# Patient Record
Sex: MALE | Race: OTHER | Employment: OTHER | URBAN - METROPOLITAN AREA
[De-identification: names, ages, dates, MRNs, and addresses within clinical notes are randomized per-mention and may not be internally consistent; named-entity substitution may affect disease eponyms.]

---

## 2020-11-11 ENCOUNTER — APPOINTMENT (OUTPATIENT)
Dept: GENERAL RADIOLOGY | Facility: HOSPITAL | Age: 45
End: 2020-11-11
Attending: EMERGENCY MEDICINE
Payer: COMMERCIAL

## 2020-11-11 ENCOUNTER — HOSPITAL ENCOUNTER (OUTPATIENT)
Facility: HOSPITAL | Age: 45
Setting detail: OBSERVATION
Discharge: HOME | End: 2020-11-13
Attending: EMERGENCY MEDICINE | Admitting: HOSPITALIST
Payer: COMMERCIAL

## 2020-11-11 ENCOUNTER — HOSPITAL ENCOUNTER (OUTPATIENT)
Age: 45
Discharge: ANOTHER HOSPITAL | End: 2020-11-11
Payer: COMMERCIAL

## 2020-11-11 VITALS
HEART RATE: 97 BPM | OXYGEN SATURATION: 93 % | SYSTOLIC BLOOD PRESSURE: 129 MMHG | RESPIRATION RATE: 26 BRPM | DIASTOLIC BLOOD PRESSURE: 81 MMHG

## 2020-11-11 DIAGNOSIS — U07.1 PNEUMONIA DUE TO COVID-19 VIRUS: Primary | ICD-10-CM

## 2020-11-11 DIAGNOSIS — U07.1 COVID-19: Primary | ICD-10-CM

## 2020-11-11 DIAGNOSIS — J12.82 PNEUMONIA DUE TO COVID-19 VIRUS: Primary | ICD-10-CM

## 2020-11-11 PROCEDURE — 71045 X-RAY EXAM CHEST 1 VIEW: CPT | Performed by: EMERGENCY MEDICINE

## 2020-11-11 PROCEDURE — 99220 INITIAL OBSERVATION CARE,LEVL III: CPT | Performed by: HOSPITALIST

## 2020-11-11 PROCEDURE — 99203 OFFICE O/P NEW LOW 30 MIN: CPT

## 2020-11-11 RX ORDER — HYDROCHLOROTHIAZIDE 12.5 MG/1
12.5 TABLET ORAL DAILY
COMMUNITY
Start: 2020-10-10

## 2020-11-11 RX ORDER — ENOXAPARIN SODIUM 100 MG/ML
40 INJECTION SUBCUTANEOUS NIGHTLY
Status: DISCONTINUED | OUTPATIENT
Start: 2020-11-11 | End: 2020-11-13

## 2020-11-11 RX ORDER — ESCITALOPRAM OXALATE 10 MG/1
10 TABLET ORAL DAILY
Status: DISCONTINUED | OUTPATIENT
Start: 2020-11-12 | End: 2020-11-13

## 2020-11-11 RX ORDER — DILTIAZEM HYDROCHLORIDE 240 MG/1
240 CAPSULE, EXTENDED RELEASE ORAL DAILY
COMMUNITY

## 2020-11-11 RX ORDER — ONDANSETRON 2 MG/ML
4 INJECTION INTRAMUSCULAR; INTRAVENOUS EVERY 6 HOURS PRN
Status: DISCONTINUED | OUTPATIENT
Start: 2020-11-11 | End: 2020-11-13

## 2020-11-11 RX ORDER — PIOGLITAZONEHYDROCHLORIDE 30 MG/1
30 TABLET ORAL DAILY
COMMUNITY
Start: 2020-05-25

## 2020-11-11 RX ORDER — DILTIAZEM HYDROCHLORIDE 120 MG/1
240 CAPSULE, EXTENDED RELEASE ORAL DAILY
Status: DISCONTINUED | OUTPATIENT
Start: 2020-11-12 | End: 2020-11-13

## 2020-11-11 RX ORDER — ATORVASTATIN CALCIUM 40 MG/1
TABLET, FILM COATED ORAL
COMMUNITY
End: 2020-11-11 | Stop reason: CLARIF

## 2020-11-11 RX ORDER — HYDROCHLOROTHIAZIDE 12.5 MG/1
12.5 CAPSULE, GELATIN COATED ORAL DAILY
Status: DISCONTINUED | OUTPATIENT
Start: 2020-11-12 | End: 2020-11-13

## 2020-11-11 RX ORDER — ESCITALOPRAM OXALATE 10 MG/1
10 TABLET ORAL DAILY
COMMUNITY
Start: 2020-07-10

## 2020-11-11 RX ORDER — ACETAMINOPHEN 500 MG
1000 TABLET ORAL EVERY 6 HOURS PRN
Status: DISCONTINUED | OUTPATIENT
Start: 2020-11-11 | End: 2020-11-13

## 2020-11-11 RX ORDER — PIOGLITAZONEHYDROCHLORIDE 15 MG/1
15 TABLET ORAL DAILY
COMMUNITY
Start: 2020-08-29 | End: 2020-11-11 | Stop reason: CLARIF

## 2020-11-11 RX ORDER — ATORVASTATIN CALCIUM 40 MG/1
40 TABLET, FILM COATED ORAL NIGHTLY
Status: DISCONTINUED | OUTPATIENT
Start: 2020-11-11 | End: 2020-11-13

## 2020-11-11 RX ORDER — ATORVASTATIN CALCIUM 40 MG/1
40 TABLET, FILM COATED ORAL EVERY EVENING
COMMUNITY

## 2020-11-11 NOTE — ED PROVIDER NOTES
Patient Seen in: [facility]      History   Patient presents with:  Difficulty Breathing    Stated Complaint: positive covid +  / short of breath    HPI    22-year-old male. Known COVID-19. On day 8 of symptoms.   Was sent home with a pul management of this patient. Nontoxic-appearing male sitting in [facility]. At rest, 94% room air. He endorses a saturation as low as 87% while at home and while walking. Known COVID-19. On day 8 of symptoms.   Will recommend transfer to the emergency dep

## 2020-11-11 NOTE — ED PROVIDER NOTES
Patient Seen in: [facility] Emergency Department      History   Patient presents with:  Difficulty Breathing    Stated Complaint: SOB, covid +, from urgent care    HPI    51-year-old  male presents emergency room today for complaint of short there is no rebound or guarding noted no hepatosplenomegaly is noted. No masses are noted. No hernias are palpated. Extremity exam reveals no clubbing cyanosis or edema. Patient has good radial pulses noted bilaterally in the upper extremities .     Malinda Trent reveals:    FINDINGS:     LUNGS/PLEURA: Irene Rhein is some minimal patchy interstitial parenchymal disease in the upper lobes, right midlung, and lung bases most severe in the medial right lung base suggesting COVID-19 pneumonia.  No significant effusion.    CA

## 2020-11-11 NOTE — PLAN OF CARE
A/O. 91-94% on RA. Dyspnea with exertion per pt report. Pt states his oxygen level would drop to 81% when went to bathroom or to kitchen at home. Nonproductive dry cough. Need sputum. IS. RT to teach. CPOX. NSR on tele. To have EKG.  Pt states having diarr baseline bowel function  Description: INTERVENTIONS:  - Assess bowel function  - Maintain adequate hydration with IV or PO as ordered and tolerated  - Evaluate effectiveness of GI medications  - Encourage mobilization and activity  - Obtain nutritional con

## 2020-11-11 NOTE — ED INITIAL ASSESSMENT (HPI)
Shortness of breath- started Saturday. Pt tested + covid Thursday. Pt states he felt short of breath walking to bathroom. States O2 sat 91% at home   Fever-  101-102 since tuesday   Pt had covid test done at [facility] 11/5  And tested + covid.

## 2020-11-11 NOTE — CONSULTS
INFECTIOUS DISEASE CONSULTATION    Savageabida Stephenson Patient Status:  Observation    1975 MRN [id number]   [facility] 4NW-A Attending Brian Rogers MD   Hosp Day # 0 PCP None Pcp       Req escitalopram 10 MG Oral Tab, Take 10 mg by mouth daily. , Disp: , Rfl:     •  hydrochlorothiazide 12.5 MG Oral Tab, Take 12.5 mg by mouth daily. , Disp: , Rfl:     •  metFORMIN HCl 1000 MG Oral Tab, Take 1,000 mg by mouth 2 (two) times daily with meals. , D Kinase  Recent Labs   Lab 11/11/20  1138          Inflammatory Markers  Recent Labs   Lab 11/11/20  1138   CRP 6.04*   POOJA 393.9      DDIMER <0.27       Lab Results   Component Value Date    DDIMER <0.27 11/11/2020    CRP 6.04 11/11/2020    TR

## 2020-11-11 NOTE — H&P
[facility]  History and Physical     Jose Alejandroani Portilloalana Patient Status:  Observation    1975 MRN [id number]   [facility] 4NW-A Attending Hesham Rao MD   Hosp Day # 0 PCP None Pcp     Chief Complaint: SOB     History of Presen MG Oral Capsule SR 24 Hr, Take 240 mg by mouth daily. , Disp: , Rfl:       Physical Exam:    /68 (BP Location: Right arm)   Pulse 77   Temp 99.2 °F (37.3 °C) (Oral)   Resp 18   SpO2 98%   General: No acute distress. Alert and oriented x 3.   Nicole Bergman care discussed with patient    Aylin Downs MD

## 2020-11-12 PROCEDURE — 99225 SUBSEQUENT OBSERVATION CARE: CPT | Performed by: HOSPITALIST

## 2020-11-12 NOTE — PLAN OF CARE
Pt is AOX4, VSS. Pt had temp of 100.9, tylenol administered per orders. Pt is on room air, 02 sats WDL. Pt complains of SOB with activity. Incentive spirometer encouraged. Pt has productive cough, sputum sample sent per orders.  Remdesivir administered per

## 2020-11-12 NOTE — DIETARY NOTE
[facility]    NUTRITION INITIAL ASSESSMENT    Pt does not meet malnutrition criteria. NUTRITION DIAGNOSIS/PROBLEM:    Inadequate oral intake related to COVID-19 as evidenced by poor appetite, MST score of 2, and wt loss of 9 lbs (self reported). 1. PO intake to meet at least 75% patient nutrition prescription  2. At least 75% intake of oral supplements  3. No signs of skin breakdown  4.  Maintain lean body mass    MEDICATIONS:  Diltiazem, Remdesivir    LABS:  Noted    Pt is at moderate nutrition ri

## 2020-11-12 NOTE — PROGRESS NOTES
[facility]  Progress Note     Christian Whitehead Patient Status:  Observation    1975 MRN [id number]   [facility] 4NW-A Attending Jessi Rosas MD   Hosp Day # 0 PCP None Pcp     Chief Complaint: SOB    S: Patient feels well, want to CRP 6.04* 8.10*   POOJA 393.9 406.6    197   DDIMER <0.27 0.32         Imaging: Imaging data reviewed in Epic.     Medications:   • atorvastatin  40 mg Oral Nightly   • dilTIAZem HCl ER  240 mg Oral Daily   • escitalopram  10 mg Oral Daily   • hydroc

## 2020-11-12 NOTE — PLAN OF CARE
A&Ox4. VSS, afebrile. RA with WNL O2 sats. SOB with exertion, but better than admission per patient. Asking about discharge plans. Loose stools persistent- sample sent to lab. NEGATIVE for C. Diff. Tolerating diet, no N/V/D. Ambulatory, independent. Maintains or returns to baseline bowel function  Description: INTERVENTIONS:  - Assess bowel function  - Maintain adequate hydration with IV or PO as ordered and tolerated  - Evaluate effectiveness of GI medications  - Encourage mobilization and activity

## 2020-11-13 VITALS
OXYGEN SATURATION: 94 % | SYSTOLIC BLOOD PRESSURE: 117 MMHG | BODY MASS INDEX: 34.22 KG/M2 | WEIGHT: 212.94 LBS | HEART RATE: 70 BPM | TEMPERATURE: 98 F | RESPIRATION RATE: 18 BRPM | DIASTOLIC BLOOD PRESSURE: 76 MMHG | HEIGHT: 66 IN

## 2020-11-13 PROCEDURE — 99217 OBSERVATION CARE DISCHARGE: CPT | Performed by: HOSPITALIST

## 2020-11-13 NOTE — PLAN OF CARE
NURSING DISCHARGE NOTE    Discharged Home via Wheelchair. Accompanied by Support staff- family picking him up at the [facility]. Belongings Taken by patient/family. AVS printed and provided to patient.  Discharge plan of care including COVID19

## 2020-11-13 NOTE — PROGRESS NOTES
[facility]                INFECTIOUS DISEASE PROGRESS NOTE     Patient Status:  Observation    1975 MRN [id number]   [facility] 4NW-A Attending Iliana Betancur MD   Hosp Day # 0 PCP None Pcp     Antibiotics: Afua Hyponatremia        ASSESSMENT/PLAN:  1.  COVID-19 pneumonia  Exposure at workplace  -symptoms onset 11/3  Tested at facility in [facility] (did not know name)  Admitted with dyspnea, CXR showing bilateral infiltrates, and hypoxia at home  02 sats mid 90s

## 2020-11-13 NOTE — PROGRESS NOTES
[facility]                INFECTIOUS DISEASE PROGRESS NOTE    Yolande Quijano Patient Status:  Observation    1975 MRN [id number]   [facility] 4NW-A Attending Kwadwo Magaña MD   Hosp Day # 0 PCP None Pcp     Antibiotics: Afua noted      Problem list reviewed:  Patient Active Problem List:     Pneumonia due to COVID-19 virus     Essential hypertension     Diabetes mellitus type 2 in obese (HCC)     Hyponatremia        ASSESSMENT/PLAN:  1.  COVID-19 pneumonia  Exposure at [employer]

## 2020-11-13 NOTE — PLAN OF CARE
Pt is aox4 on room air pt has some SOB with exertion, at rest pt denies any sob. Pt Is on tele and via tele monitor pt is NSR , pt vitals has been stable and pt has been afebrile during this shift. Pt has denied any pain during this shift.  PT was told to s pharmacy to review patient's medication profile  Outcome: Progressing     Problem: Diabetes/Glucose Control  Goal: Glucose maintained within prescribed range  Description: INTERVENTIONS:  - Monitor Blood Glucose as ordered  - Assess for signs and symptoms

## 2020-11-13 NOTE — PROGRESS NOTES
[facility]  Progress Note     Kit Plane Patient Status:  Observation    1975 MRN [id number]   [facility] 4NW-A Attending Gerardo Garcia MD   Hosp Day # 0 PCP None Pcp     Chief Complaint: SOB    S: Patient has no complaints, Kinase  Recent Labs   Lab 11/11/20  1138          Inflammatory Markers  Recent Labs   Lab 11/11/20  1138 11/12/20  0804 11/13/20  0914   CRP 6.04* 8.10* 6.67*   POOJA 393.9 406.6 456.9    197 201   DDIMER <0.27 0.32 0.41         Imaging: Imaging

## 2020-11-13 NOTE — PLAN OF CARE
A&Ox4. VSS, afebrile. RA with WNL O2 sats. SOB persistent but improving per patient. No cough noted, no complaints of dyspnea. Tolerated 2nd dose of Remdesivir last night.  Per Dr. Ernestine Shelton- patient improved greatly, no need for 3rd dose of Remdesivir befor nonpharmacologic comfort measures as appropriate  - Advance diet as tolerated, if ordered  - Obtain nutritional consult as needed  - Evaluate fluid balance  Outcome: Adequate for Discharge  Goal: Maintains or returns to baseline bowel function  Description

## 2020-11-15 NOTE — DISCHARGE SUMMARY
[facility]  DISCHARGE SUMMARY     Madyson Isabel Patient Status:  Observation    1975 MRN [id number]   [facility] 4NW-A Attending No att. providers found   [facility] Day # 0 PCP None Pcp     Date of Admission: 2020  Date of Dis evening. Refills: 0     dilTIAZem HCl  MG Cp24  Commonly known as: DILACOR XR      Take 240 mg by mouth daily. Refills: 0     escitalopram 10 MG Tabs  Commonly known as: LEXAPRO      Take 10 mg by mouth daily.    Refills: 0     hydrochlorothiazide

## 2020-11-16 ENCOUNTER — PATIENT OUTREACH (OUTPATIENT)
Dept: CASE MANAGEMENT | Age: 45
End: 2020-11-16

## 2020-11-16 NOTE — PROGRESS NOTES
LM for pt to call [facility] for TCM since discharge. [facility] phone number was provided for pt to call back. [facility] and [facility] contact information was left for the pt to call back.

## 2020-11-17 NOTE — PROGRESS NOTES
LM for pt to call [facility] for TCM since discharge. [facility] phone number was provided for pt to call back. NCM and [facility] contact information left for the pt to call back.